# Patient Record
Sex: FEMALE
[De-identification: names, ages, dates, MRNs, and addresses within clinical notes are randomized per-mention and may not be internally consistent; named-entity substitution may affect disease eponyms.]

---

## 2021-08-03 ENCOUNTER — HOSPITAL ENCOUNTER (OUTPATIENT)
Dept: HOSPITAL 47 - RADUSWWP | Age: 26
Discharge: HOME | End: 2021-08-03
Attending: FAMILY MEDICINE
Payer: COMMERCIAL

## 2021-08-03 DIAGNOSIS — N83.202: Primary | ICD-10-CM

## 2021-08-03 PROCEDURE — 76856 US EXAM PELVIC COMPLETE: CPT

## 2021-08-03 PROCEDURE — 76705 ECHO EXAM OF ABDOMEN: CPT

## 2021-08-03 NOTE — US
EXAMINATION TYPE: US abdomen APPY

 

DATE OF EXAM: 8/3/2021

 

COMPARISON: NONE

 

CLINICAL HISTORY: 26-year-old female R10.2 FEMALE GENITAL SYMPTOMS, R10.31 ABDOMINAL PAIN. LEFT GROIN
 PAIN PER PATIENT and is usually  noted when patient has sciatica issues

 

TECHNIQUE: Multiple sonographic images of the right upper quadrant process and in the appendix with s
tatic and graded compression.

 

FINDINGS:

 

APPENDIX

AP Diameter (normal < 6mm):  4.6 mm

     Measured outer wall to outer wall.

 

Is the appendix seen in its entirety from the proximal cecum to distal end: YES  

Is the appendix compressible:  Yes 

Does the appendix wall appear hypervascular:  No 

Is an appendicolith present:  No 

Is there inflammatory changes or free fluid present:  No 

 

 

 

 

 

IMPRESSION:  

Appendix visualized in the right lower quadrant. No sonographic evidence for acute appendicitis.

## 2021-08-03 NOTE — US
EXAMINATION TYPE: US pelvic complete plus Dopplers oh

 

DATE OF EXAM: 8/3/2021

 

COMPARISON: NONE

 

CLINICAL HISTORY: 26-year-old female R10.2 PELVIC PAIN,R10.2 FEMALE GENITAL SYMPTOMS, ABD PAIN. Left 
groin pain and usually noted with sciatica. G1P!; has IUD x 6 years

 

TECHNIQUE:  Transabdominal (TA).  Transabdominal sonographic images of the pelvis were acquired.  

 

Date of LMP:  07/20/2021

 

FINDINGS:

 

EXAM MEASUREMENTS:

 

Uterus:  8.8 x 5.4 x  4.0  cm

Endometrial Stripe: 0.9 cm

Right Ovary:  2.9 x 2.7 x 2.7 cm

Left Ovary:  2.8 x 2.4 x 2.9 cm

 

 

 

1. Uterus:  Anteverted  

2. Endometrium:  IUD seen in normal location along the upper and mid uterine cavity

3. Right Ovary:  small follicles seen

4. Left Ovary:  Follicular changes. Thick walled cyst measuring 1.9 x 1.7 x 1.8cm 

**Spectral, color and waveform doppler imaging shows good arterial and venous flow within the ovaries
; there is no evidence for ovarian torsion.

5. Bilateral Adnexa:  wnl

6. Posterior cul-de-sac:  small amount of free fluid seen here = 1.4 x 2.3 x 1.1cm. 

 

LEFT groin US for pain here: multiple small lymph nodes seen with largest = 0.5 x 0.5 x 0.3cm. 

 

 

 

IMPRESSION: 

1. IUD appropriately positioned.

2. Normal follicular change in the ovaries. A thick walled 1.9 cm cyst in the left ovary likely repre
sents a corpus luteum.

3. No sonographic evidence for ovarian torsion.

4. Trace cul-de-sac free fluid likely physiologic.

5. Targeted scanning along the patient's left groin shows a few nonenlarged lymph nodes. No specific 
abnormal bowel is seen here.

## 2022-07-05 ENCOUNTER — HOSPITAL ENCOUNTER (EMERGENCY)
Dept: HOSPITAL 47 - EC | Age: 27
Discharge: HOME | End: 2022-07-05
Payer: COMMERCIAL

## 2022-07-05 VITALS
TEMPERATURE: 97.8 F | SYSTOLIC BLOOD PRESSURE: 95 MMHG | RESPIRATION RATE: 16 BRPM | DIASTOLIC BLOOD PRESSURE: 59 MMHG | HEART RATE: 57 BPM

## 2022-07-05 DIAGNOSIS — Z23: ICD-10-CM
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
## 2022-07-05 DIAGNOSIS — S61.215A: Primary | ICD-10-CM
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
## 2022-07-05 PROCEDURE — 90715 TDAP VACCINE 7 YRS/> IM: CPT

## 2022-07-05 PROCEDURE — 99282 EMERGENCY DEPT VISIT SF MDM: CPT

## 2022-07-05 PROCEDURE — 90471 IMMUNIZATION ADMIN: CPT

## 2022-07-05 NOTE — ED
Wound/Laceration HPI





- General


Chief Complaint: Wound/Laceration


Stated Complaint: Finger Laceration


Time Seen by Provider: 07/05/22 22:26


Source: patient


Mode of arrival: ambulatory


Limitations: no limitations





- History of Present Illness


Initial Comments: 


Patient is a 27-year-old female presenting with chief complaint of finger 

laceration.  Patient was cutting watermelon at home when the knife slipped and 

cut her left fourth digit near the distal end.  Patient denies any numbness, 

tingling, weakness, loss of range of motion.  She does not know when her last 

tetanus shot was.








- Related Data


                                    Allergies











Allergy/AdvReac Type Severity Reaction Status Date / Time


 


No Known Allergies Allergy   Verified 07/05/22 21:49














Review of Systems


ROS Statement: 


Those systems with pertinent positive or pertinent negative responses have been 

documented in the HPI.





ROS Other: All systems not noted in ROS Statement are negative.





Past Medical History


Past Medical History: No Reported History


History of Any Multi-Drug Resistant Organisms: ESBL


Date of last positivie culture/infection: 2/8/21


MDRO Source:: ESBL URINE


Past Surgical History: No Surgical Hx Reported





General Exam


Limitations: no limitations


General appearance: alert, in no apparent distress


Head exam: Present: atraumatic, normocephalic, normal inspection


Eye exam: Present: normal appearance, EOMI.  Absent: scleral icterus


Neck exam: Present: normal inspection


Extremities exam: Present: full ROM, normal capillary refill, other (One 

centimeter laceration to the distal end of the left fourth digit, superficial). 

Absent: tenderness


Back exam: Present: normal inspection


Neurological exam: Present: alert, oriented X3, CN II-XII intact


Psychiatric exam: Present: normal affect, normal mood





Course


                                   Vital Signs











  07/05/22





  21:47


 


Temperature 97.8 F


 


Pulse Rate 57 L


 


Respiratory 16





Rate 


 


Blood Pressure 95/59


 


O2 Sat by Pulse 100





Oximetry 














Medical Decision Making





- Medical Decision Making


Patient is a 27-year-old female presenting with laceration to the left fourth 

digit.  This was obtained while cutting watermelon.  Patient does not known her 

last tetanus shot was.  On examination there is a superficial 1 cm laceration to

the distal end of the finger.  Wound was repaired using Dermabond.  Educated on 

wound care.  Educated on signs of infection.  Tetanus was updated today.  

Follow-up with PCP.  Report back to ER if any new or worsening symptoms.  

Discussed return parameters answered all questions.  Patient conveyed verbal 

understanding and agreed to the plan.  My attending is Dr. Gracia.








Disposition


Clinical Impression: 


 Laceration





Disposition: HOME SELF-CARE


Condition: Good


Instructions (If sedation given, give patient instructions):  Finger Laceration 

(ED), Skin Adhesive Care (ED)


Additional Instructions: 


Follow-up with PCP.  Report back to ER with any new or worsening symptoms.  

Avoid ointment based products over the skin adhesive, as this will cause it to 

break down.  Keep the wound clean and dry.  Monitor for any signs of infection, 

including but not limited to redness, swelling, discharge, warmth, fever, 

chills.


Is patient prescribed a controlled substance at d/c from ED?: No


Referrals: 


None,Stated [Primary Care Provider] - 1-2 days


Time of Disposition: 23:03